# Patient Record
Sex: MALE | Race: WHITE
[De-identification: names, ages, dates, MRNs, and addresses within clinical notes are randomized per-mention and may not be internally consistent; named-entity substitution may affect disease eponyms.]

---

## 2018-02-28 ENCOUNTER — HOSPITAL ENCOUNTER (INPATIENT)
Dept: HOSPITAL 72 - EMR | Age: 53
LOS: 2 days | Discharge: HOME | DRG: 947 | End: 2018-03-02
Payer: MEDICARE

## 2018-02-28 VITALS — SYSTOLIC BLOOD PRESSURE: 120 MMHG | DIASTOLIC BLOOD PRESSURE: 80 MMHG

## 2018-02-28 VITALS — DIASTOLIC BLOOD PRESSURE: 95 MMHG | SYSTOLIC BLOOD PRESSURE: 141 MMHG

## 2018-02-28 VITALS — WEIGHT: 184 LBS | BODY MASS INDEX: 24.39 KG/M2 | HEIGHT: 73 IN

## 2018-02-28 VITALS — SYSTOLIC BLOOD PRESSURE: 139 MMHG | DIASTOLIC BLOOD PRESSURE: 99 MMHG

## 2018-02-28 DIAGNOSIS — E78.5: ICD-10-CM

## 2018-02-28 DIAGNOSIS — Z88.2: ICD-10-CM

## 2018-02-28 DIAGNOSIS — B20: ICD-10-CM

## 2018-02-28 DIAGNOSIS — R94.39: ICD-10-CM

## 2018-02-28 DIAGNOSIS — Z79.899: ICD-10-CM

## 2018-02-28 DIAGNOSIS — G61.81: ICD-10-CM

## 2018-02-28 DIAGNOSIS — R60.0: Primary | ICD-10-CM

## 2018-02-28 DIAGNOSIS — T46.1X5A: ICD-10-CM

## 2018-02-28 DIAGNOSIS — I10: ICD-10-CM

## 2018-02-28 DIAGNOSIS — R74.8: ICD-10-CM

## 2018-02-28 LAB
ADD MANUAL DIFF: NO
ALBUMIN SERPL-MCNC: 3.5 G/DL (ref 3.4–5)
ALBUMIN/GLOB SERPL: 0.7 {RATIO} (ref 1–2.7)
ALP SERPL-CCNC: 51 U/L (ref 46–116)
ALT SERPL-CCNC: 44 U/L (ref 12–78)
ANION GAP SERPL CALC-SCNC: 8 MMOL/L (ref 5–15)
AST SERPL-CCNC: 44 U/L (ref 15–37)
BASOPHILS NFR BLD AUTO: 1.8 % (ref 0–2)
BILIRUB SERPL-MCNC: 0.6 MG/DL (ref 0.2–1)
BUN SERPL-MCNC: 15 MG/DL (ref 7–18)
CALCIUM SERPL-MCNC: 8.6 MG/DL (ref 8.5–10.1)
CHLORIDE SERPL-SCNC: 103 MMOL/L (ref 98–107)
CK MB SERPL-MCNC: 5.3 NG/ML (ref 0–3.6)
CK SERPL-CCNC: 457 U/L (ref 26–308)
CO2 SERPL-SCNC: 27 MMOL/L (ref 21–32)
CREAT SERPL-MCNC: 1.1 MG/DL (ref 0.55–1.3)
EOSINOPHIL NFR BLD AUTO: 2.5 % (ref 0–3)
ERYTHROCYTE [DISTWIDTH] IN BLOOD BY AUTOMATED COUNT: 12.2 % (ref 11.6–14.8)
GLOBULIN SER-MCNC: 4.8 G/DL
HCT VFR BLD CALC: 47.9 % (ref 42–52)
HGB BLD-MCNC: 16.8 G/DL (ref 14.2–18)
LYMPHOCYTES NFR BLD AUTO: 34.9 % (ref 20–45)
MCV RBC AUTO: 93 FL (ref 80–99)
MONOCYTES NFR BLD AUTO: 9.4 % (ref 1–10)
NEUTROPHILS NFR BLD AUTO: 51.4 % (ref 45–75)
PLATELET # BLD: 180 K/UL (ref 150–450)
POTASSIUM SERPL-SCNC: 4 MMOL/L (ref 3.5–5.1)
RBC # BLD AUTO: 5.17 M/UL (ref 4.7–6.1)
SODIUM SERPL-SCNC: 137 MMOL/L (ref 136–145)
WBC # BLD AUTO: 7.1 K/UL (ref 4.8–10.8)

## 2018-02-28 PROCEDURE — 93017 CV STRESS TEST TRACING ONLY: CPT

## 2018-02-28 PROCEDURE — 82553 CREATINE MB FRACTION: CPT

## 2018-02-28 PROCEDURE — 93005 ELECTROCARDIOGRAM TRACING: CPT

## 2018-02-28 PROCEDURE — 83880 ASSAY OF NATRIURETIC PEPTIDE: CPT

## 2018-02-28 PROCEDURE — 80048 BASIC METABOLIC PNL TOTAL CA: CPT

## 2018-02-28 PROCEDURE — 83735 ASSAY OF MAGNESIUM: CPT

## 2018-02-28 PROCEDURE — 93970 EXTREMITY STUDY: CPT

## 2018-02-28 PROCEDURE — 78452 HT MUSCLE IMAGE SPECT MULT: CPT

## 2018-02-28 PROCEDURE — 99285 EMERGENCY DEPT VISIT HI MDM: CPT

## 2018-02-28 PROCEDURE — 71045 X-RAY EXAM CHEST 1 VIEW: CPT

## 2018-02-28 PROCEDURE — 85025 COMPLETE CBC W/AUTO DIFF WBC: CPT

## 2018-02-28 PROCEDURE — 84484 ASSAY OF TROPONIN QUANT: CPT

## 2018-02-28 PROCEDURE — 84443 ASSAY THYROID STIM HORMONE: CPT

## 2018-02-28 PROCEDURE — 93306 TTE W/DOPPLER COMPLETE: CPT

## 2018-02-28 PROCEDURE — 80053 COMPREHEN METABOLIC PANEL: CPT

## 2018-02-28 PROCEDURE — 36415 COLL VENOUS BLD VENIPUNCTURE: CPT

## 2018-02-28 PROCEDURE — 80061 LIPID PANEL: CPT

## 2018-02-28 PROCEDURE — 82550 ASSAY OF CK (CPK): CPT

## 2018-02-28 NOTE — CARDIOLOGY PROGRESS NOTE
Assessment/Plan


Assessment/Plan


[id number]


incerae acei 


venous duplex 


repeat ekg adn trop 


may fgive on e dose of diuretics in am for help with bp cotnroladn edema 


echo





Objective





Last 24 Hour Vital Signs








  Date Time  Temp Pulse Resp B/P (MAP) Pulse Ox O2 Delivery O2 Flow Rate FiO2


 


2/28/18 17:26  88 16   Room Air  


 


2/28/18 17:08 98.8 88 16 176/107 97 Room Air  





 98.8       











Laboratory Tests








Test


  2/28/18


18:39


 


White Blood Count


  7.1 K/UL


(4.8-10.8)


 


Red Blood Count


  5.17 M/UL


(4.70-6.10)


 


Hemoglobin


  16.8 G/DL


(14.2-18.0)


 


Hematocrit


  47.9 %


(42.0-52.0)


 


Mean Corpuscular Volume 93 FL (80-99)  


 


Mean Corpuscular Hemoglobin


  32.6 PG


(27.0-31.0)  H


 


Mean Corpuscular Hemoglobin


Concent 35.2 G/DL


(32.0-36.0)


 


Red Cell Distribution Width


  12.2 %


(11.6-14.8)


 


Platelet Count


  180 K/UL


(150-450)


 


Mean Platelet Volume


  9.5 FL


(6.5-10.1)


 


Neutrophils (%) (Auto)


  51.4 %


(45.0-75.0)


 


Lymphocytes (%) (Auto)


  34.9 %


(20.0-45.0)


 


Monocytes (%) (Auto)


  9.4 %


(1.0-10.0)


 


Eosinophils (%) (Auto)


  2.5 %


(0.0-3.0)


 


Basophils (%) (Auto)


  1.8 %


(0.0-2.0)


 


Sodium Level


  137 MMOL/L


(136-145)


 


Potassium Level


  4.0 MMOL/L


(3.5-5.1)


 


Chloride Level


  103 MMOL/L


()


 


Carbon Dioxide Level


  27 MMOL/L


(21-32)


 


Anion Gap


  8 mmol/L


(5-15)


 


Blood Urea Nitrogen


  15 mg/dL


(7-18)


 


Creatinine


  1.1 MG/DL


(0.55-1.30)


 


Estimat Glomerular Filtration


Rate > 60 mL/min


(>60)


 


Glucose Level


  76 MG/DL


()


 


Calcium Level


  8.6 MG/DL


(8.5-10.1)


 


Total Bilirubin


  0.6 MG/DL


(0.2-1.0)


 


Aspartate Amino Transf


(AST/SGOT) 44 U/L (15-37)


H


 


Alanine Aminotransferase


(ALT/SGPT) 44 U/L (12-78)


 


 


Alkaline Phosphatase


  51 U/L


()


 


Total Creatine Kinase


  457 U/L


()  H


 


Creatine Kinase MB


  5.3 NG/ML


(0.0-3.6)  H


 


Creatine Kinase MB Relative


Index 1.1  


 


 


Troponin I


  0.086 ng/mL


(0.000-0.056)


 


Pro-B-Type Natriuretic Peptide


  67 pg/mL


(0-125)


 


Total Protein


  8.3 G/DL


(6.4-8.2)  H


 


Albumin


  3.5 G/DL


(3.4-5.0)


 


Globulin 4.8 g/dL  


 


Albumin/Globulin Ratio


  0.7 (1.0-2.7)


DEMARCO CARTER Feb 28, 2018 21:09

## 2018-02-28 NOTE — EMERGENCY ROOM REPORT
History of Present Illness


General


Chief Complaint:  Edema


Source:  Patient





Present Illness


HPI


52-year-old male presents ED for evaluation.  Referred here from PMD Dr. Hernandez for evaluation of bilateral leg swelling.  Has been there for soft 

weeks now.  Patient denies any pain.  Denies any chest pain or shortness of 

breath.  No other aggravating relieving factors.  Denies any other associated 

symptom


Allergies:  


Coded Allergies:  


     SULFA (SULFONAMIDE ANTIBIOTICS) (Verified  Allergy, Unknown, 2/28/18)





Patient History


Past Medical History:  HTN


Past Surgical History:  none


Pertinent Family History:  none


Social History:  Denies: smoking, alcohol use, drug use


Immunizations:  UTD


Reviewed Nursing Documentation:  PMH: Agreed, PSxH: Agreed





Nursing Documentation-PMH


Hx Hypertension:  Yes





Review of Systems


All Other Systems:  negative except mentioned in HPI





Physical Exam





Vital Signs








  Date Time  Temp Pulse Resp B/P (MAP) Pulse Ox O2 Delivery O2 Flow Rate FiO2


 


2/28/18 17:08 98.8 88 16 176/107 97 Room Air  





 98.8       








Sp02 EP Interpretation:  reviewed, normal


General Appearance:  no apparent distress, alert, GCS 15, non-toxic


Head:  normocephalic, atraumatic


Eyes:  bilateral eye normal inspection, bilateral eye PERRL


ENT:  hearing grossly normal, normal pharynx, no angioedema, normal voice


Neck:  full range of motion, supple/symm/no masses


Respiratory:  chest non-tender, lungs clear, normal breath sounds, speaking 

full sentences


Cardiovascular #1:  regular rate, rhythm, no edema


Cardiovascular #2:  2+ carotid (R), 2+ carotid (L), 2+ radial (R), 2+ radial (L)

, 2+ dorsalis pedis (R), 2+ dorsalis pedis (L)


Gastrointestinal:  normal bowel sounds, non tender, soft, non-distended, no 

guarding, no rebound


Rectal:  deferred


Genitourinary:  normal inspection, no CVA tenderness


Musculoskeletal:  back normal, gait/station normal, normal range of motion, non-

tender, swelling - 1+ pitting edema b/l LEs


Neurologic:  alert, oriented x3, responsive, motor strength/tone normal, 

sensory intact, speech normal


Psychiatric:  judgement/insight normal, memory normal, mood/affect normal, no 

suicidal/homicidal ideation


Reflexes:  3+ bicep (R), 3+ bicep (L), 3+ tricep (R), 3+ tricep (L), 3+ knee (R)

, 3+ knee (L)


Skin:  normal color, no rash, warm/dry, well hydrated


Lymphatic:  no adenopathy





Medical Decision Making


Diagnostic Impression:  


 Primary Impression:  


 Edema


 Qualified Codes:  R60.9 - Edema, unspecified


 Additional Impression:  


 Elevated troponin


ER Course


Hospital Course 


52-year-old male presents ED for evaluation of bilateral leg swelling





Differential diagnoses include: MI/unstable angina, CHF, pedal edema





Clinical course


Patient placed on stretcher. on cardiac monitor. After initial history and 

physical I ordered labs, EKG, chest x-ray, 





labs reviewed- no leukocytosis, hemoglobin/hematocrit stable, electrolytes okay

, troponin 0.086, BNP okay


EKG-normal sinus rhythm no acute ischemic changes interpreted by me


Chest x-ray- no cardiomegaly, no fluid overload





Patient denies any chest pain or shortness of breath.  Discussed findings with 

patient.  Given the elevated troponin and believe he should be admitted for ACS 

rule out. 





Case discussed with Dr. Hernandez  and he agreed to accept the patient to his 

service for further care and support





Dr Michelle will consult to see the patient





I.  I feel this is a highly complex case requiring extensive working including 

EKG/Rhythm strip, Xray/CT/US, Blood/urine lab work, repeat exams while in ED, 

and administration of strong opiates/narcotics for pain control, admission to 

hospital or close patient follow up.  





Diagnosis - edema, elevated troponin





admitted to telemetry in serious condition





Labs








Test


  2/28/18


18:39


 


White Blood Count


  7.1 K/UL


(4.8-10.8)


 


Red Blood Count


  5.17 M/UL


(4.70-6.10)


 


Hemoglobin


  16.8 G/DL


(14.2-18.0)


 


Hematocrit


  47.9 %


(42.0-52.0)


 


Mean Corpuscular Volume 93 FL (80-99) 


 


Mean Corpuscular Hemoglobin


  32.6 PG


(27.0-31.0)


 


Mean Corpuscular Hemoglobin


Concent 35.2 G/DL


(32.0-36.0)


 


Red Cell Distribution Width


  12.2 %


(11.6-14.8)


 


Platelet Count


  180 K/UL


(150-450)


 


Mean Platelet Volume


  9.5 FL


(6.5-10.1)


 


Neutrophils (%) (Auto)


  51.4 %


(45.0-75.0)


 


Lymphocytes (%) (Auto)


  34.9 %


(20.0-45.0)


 


Monocytes (%) (Auto)


  9.4 %


(1.0-10.0)


 


Eosinophils (%) (Auto)


  2.5 %


(0.0-3.0)


 


Basophils (%) (Auto)


  1.8 %


(0.0-2.0)


 


Sodium Level


  137 MMOL/L


(136-145)


 


Potassium Level


  4.0 MMOL/L


(3.5-5.1)


 


Chloride Level


  103 MMOL/L


()


 


Carbon Dioxide Level


  27 MMOL/L


(21-32)


 


Anion Gap


  8 mmol/L


(5-15)


 


Blood Urea Nitrogen


  15 mg/dL


(7-18)


 


Creatinine


  1.1 MG/DL


(0.55-1.30)


 


Estimat Glomerular Filtration


Rate > 60 mL/min


(>60)


 


Glucose Level


  76 MG/DL


()


 


Calcium Level


  8.6 MG/DL


(8.5-10.1)


 


Total Bilirubin


  0.6 MG/DL


(0.2-1.0)


 


Aspartate Amino Transf


(AST/SGOT) 44 U/L (15-37) 


 


 


Alanine Aminotransferase


(ALT/SGPT) 44 U/L (12-78) 


 


 


Alkaline Phosphatase


  51 U/L


()


 


Total Creatine Kinase


  457 U/L


()


 


Creatine Kinase MB


  5.3 NG/ML


(0.0-3.6)


 


Creatine Kinase MB Relative


Index 1.1 


 


 


Troponin I


  0.086 ng/mL


(0.000-0.056)


 


Pro-B-Type Natriuretic Peptide


  67 pg/mL


(0-125)


 


Total Protein


  8.3 G/DL


(6.4-8.2)


 


Albumin


  3.5 G/DL


(3.4-5.0)


 


Globulin 4.8 g/dL 


 


Albumin/Globulin Ratio 0.7 (1.0-2.7) 








EKG Diagnostic Results


Rate:  normal


Rhythm:  NSR


ST Segments:  no acute changes


ASA given to the pt in ED:  Yes





Rhythm Strip Diag. Results


EP Interpretation:  yes


Rhythm:  NSR, no PVC's, no ectopy





Chest X-Ray Diagnostic Results


Chest X-Ray Diagnostic Results :  


   Chest X-Ray Ordered:  Yes


   # of Views/Limited/Complete:  1 View


   Indication:  Shortness of Breath


   EP Interpretation:  Yes


   Interpretation:  no consolidation, no effusion, no pneumothorax, no acute 

cardiopulmonary disease


   Impression:  No acute disease


   Electronically Signed by:  Electronically signed by Brennon Kapoor MD





Last Vital Signs








  Date Time  Temp Pulse Resp B/P (MAP) Pulse Ox O2 Delivery O2 Flow Rate FiO2


 


2/28/18 17:26  88 16   Room Air  


 


2/28/18 17:08 98.8   176/107 97   





 98.8       








Status:  improved


Disposition:  ADMITTED AS INPATIENT


Condition:  Serious


Referrals:  


MIGUEL LEDESMA (PCP)











BRENNON KAPOOR M.D. Feb 28, 2018 22:19

## 2018-03-01 VITALS — SYSTOLIC BLOOD PRESSURE: 135 MMHG | DIASTOLIC BLOOD PRESSURE: 91 MMHG

## 2018-03-01 VITALS — DIASTOLIC BLOOD PRESSURE: 87 MMHG | SYSTOLIC BLOOD PRESSURE: 135 MMHG

## 2018-03-01 VITALS — SYSTOLIC BLOOD PRESSURE: 119 MMHG | DIASTOLIC BLOOD PRESSURE: 82 MMHG

## 2018-03-01 VITALS — SYSTOLIC BLOOD PRESSURE: 139 MMHG | DIASTOLIC BLOOD PRESSURE: 88 MMHG

## 2018-03-01 VITALS — SYSTOLIC BLOOD PRESSURE: 115 MMHG | DIASTOLIC BLOOD PRESSURE: 80 MMHG

## 2018-03-01 LAB
ADD MANUAL DIFF: NO
ANION GAP SERPL CALC-SCNC: 6 MMOL/L (ref 5–15)
BASOPHILS NFR BLD AUTO: 0.9 % (ref 0–2)
BUN SERPL-MCNC: 14 MG/DL (ref 7–18)
CALCIUM SERPL-MCNC: 8.9 MG/DL (ref 8.5–10.1)
CHLORIDE SERPL-SCNC: 102 MMOL/L (ref 98–107)
CHOLEST SERPL-MCNC: 215 MG/DL (ref ?–200)
CO2 SERPL-SCNC: 30 MMOL/L (ref 21–32)
CREAT SERPL-MCNC: 1.1 MG/DL (ref 0.55–1.3)
EOSINOPHIL NFR BLD AUTO: 2.5 % (ref 0–3)
ERYTHROCYTE [DISTWIDTH] IN BLOOD BY AUTOMATED COUNT: 12.2 % (ref 11.6–14.8)
HCT VFR BLD CALC: 51.2 % (ref 42–52)
HDLC SERPL-MCNC: 44 MG/DL (ref 40–60)
HGB BLD-MCNC: 17.8 G/DL (ref 14.2–18)
LYMPHOCYTES NFR BLD AUTO: 27.1 % (ref 20–45)
MCV RBC AUTO: 95 FL (ref 80–99)
MONOCYTES NFR BLD AUTO: 9.4 % (ref 1–10)
NEUTROPHILS NFR BLD AUTO: 60 % (ref 45–75)
PLATELET # BLD: 178 K/UL (ref 150–450)
POTASSIUM SERPL-SCNC: 4.3 MMOL/L (ref 3.5–5.1)
RBC # BLD AUTO: 5.42 M/UL (ref 4.7–6.1)
SODIUM SERPL-SCNC: 138 MMOL/L (ref 136–145)
TRIGL SERPL-MCNC: 60 MG/DL (ref 30–150)
WBC # BLD AUTO: 6 K/UL (ref 4.8–10.8)

## 2018-03-01 RX ADMIN — ATORVASTATIN CALCIUM SCH MG: 20 TABLET, FILM COATED ORAL at 21:20

## 2018-03-01 RX ADMIN — HEPARIN SODIUM SCH UNITS: 5000 INJECTION INTRAVENOUS; SUBCUTANEOUS at 21:21

## 2018-03-01 RX ADMIN — LISINOPRIL SCH MG: 20 TABLET ORAL at 09:44

## 2018-03-01 RX ADMIN — LISINOPRIL SCH MG: 20 TABLET ORAL at 01:56

## 2018-03-01 RX ADMIN — HEPARIN SODIUM SCH UNITS: 5000 INJECTION INTRAVENOUS; SUBCUTANEOUS at 09:45

## 2018-03-01 RX ADMIN — LISINOPRIL SCH MG: 20 TABLET ORAL at 18:21

## 2018-03-01 RX ADMIN — ASPIRIN 81 MG SCH MG: 81 TABLET ORAL at 09:44

## 2018-03-01 NOTE — HISTORY AND PHYSICAL REPORT
DATE OF ADMISSION:  02/28/2018



REASON FOR ADMISSION:  The patient is a 52-year-old anglin white male with HIV

infection who is admitted via the emergency with acute coronary syndrome.

 



HISTORY OF PRESENT ILLNESS:  The patient has hypertension poorly controlled

and was started on amlodipine in December 2017.  Over the last two weeks,

he developed peripheral edema and weight gain.  He continued taking

medication and travels to [place] and has only recently turned.  He stopped

eating amlodipine 48 hours ago.  He has had cough and some shortness of

breath but not chest pain.He has had peripheral ankle and foot edema.  He

has no prior cardiac history.  He was seen in the emergency room and was

found to have elevated troponin and was subsequently seen by Dr. Michelle

in Cardiology consultation and recommendation was for admission.

Electrocardiogram showed possible anterior myocardial infarction of

unknown ______



PAST MEDICAL HISTORY:  Remarkable for longstanding HIV disease.  He had one

prior [facility] admission for sepsis related to infected central line.  He

has had chronic idiopathic polyneuropathy for which he has been on

intravenous immunoglobulin therapy at home.  He has been on effective

anti-retroviral therapy most recently with Descovy, Reyataz, Norvir with

an undetectable viral load and CD4 cells in the range of 400 per cubic

millimeter.  He has also had some chronic pain related to neuropathy for

which he has used Norco no more than one tablet daily.



ALLERGIES:  None.



MEDICATIONS:  Include Reyataz 300 mg daily, Norvir 100 milligram daily,

Descovy one tablet daily.



FAMILY HISTORY:  Remarkable for his mother who has a problem with chronic

alcoholism.



SOCIAL HISTORY:  He lives with his mother in ______ [place] .  He is a riding

instructor and .  Family's origins are in [place].  He is

homosexual and is sexually active with one partner.



REVIEW OF SYSTEMS:  Negative for nausea, vomiting, diarrhea, abdominal

pain.  It is negative for orthopnea and PND.  It is positive for dyspnea

on exertion and ankle edema and negative for chest pain.



PAST SURGICAL HISTORY:  Remarkable for treatment acoustic neuroma on the

right side, subsequently neutron beam therapy and no evidence of

recurrence.



PHYSICAL EXAMINATION:

VITAL SIGNS:  Within normal limits.

HEENT:  Head, ears eyes, nose, and throat are normal. Slight right facial

droop.  Neck is supple.  Thyroid not palpable.

LUNGS:  Clear.

HEART:  S1 and S2 normal.  There is a grade 3 systolic ejection murmur at

the left sternal border.  No gallop.

ABDOMEN:  Soft without palpable liver or spleen.

EXTREMITIES:  Remarkable for 3+ pitting edema in the pretibial and ankle

areas.

NEUROLOGIC:  He is alert and oriented.  Cranial nerves II through XII

normal.  Motor, sensory, and cerebellar modalities are intact.



ASSESSMENT:

1. New onset edema, possible acute coronary syndrome versus side effect

of amlodipine.

2. HIV disease.

3. Status post acoustic neuroma.

4. Chronic idiopathic demyelinating polyneuropathy on monthly

intravenous immunoglobulin therapy.



PLAN:  He will be seen in Cardiology consultation by Dr. Michelle.

Monitoring will be ordered. Diuresis will be ordered gently.  Blood

pressure will be controlled.  Serial troponins will be followed.  Once

stable, he will be discharged home.









  ______________________________________________

  Florencio Norwood M.D.





DR:  Tyra

D:  03/01/2018 11:07

T:  03/01/2018 19:55

JOB#:  [id number]

CC:

## 2018-03-01 NOTE — CONSULTATION
DATE OF CONSULTATION:  02/28/2018



CONSULTING PHYSICIAN:  Haresh Michelle M.D.



REFERRING PHYSICIAN:  Florencio Norwood M.D.



REASON FOR REFERRAL:  Ankle swelling.



HISTORY OF PRESENT ILLNESS:  This is a 52-year-old gentleman with history

of hypertension, recent blood pressure medication changed approximately

two weeks ago or so.  Over the past few days before coming into the

hospital, he has had increasing amounts of swelling around the ankle and

the leg.  He apparently rides horses and when he does ride the horses he

has noticed that he has some discomfort in his thigh.  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

he noted to have some swelling in his abdominal wall and lower

extremities, and therefore, he got concerned.  He contacted Dr. Norwood

and was seen today, was sent to the emergency room for evaluation.  Today,

in the emergency room, he basically had some abnormal cardiac enzyme,

minimal abnormality, therefore, this consultation was requested.  The

patient absolutely denies any chest pain or pressure.  There is no PND.

No orthopnea.  No palpitations.  No dizziness or lightheadedness on

standing except for the fact that he has some leg swelling at this time,

although he stopped taking the new medication that he was given and he

feels that the leg swelling has gotten better, but he is concerned enough

to come into the hospital.



PAST MEDICAL HISTORY:  Positive for high blood pressure.  No history of

heart attack.  No cancer, although he has had some tumor on his _____ that

was resected by Gamma Knife number years ago that left him with some

facial numbness on the right side.  No stroke.  No hepatitis or

tuberculosis.  No asthma or emphysema.  No ulcers.  No kidney problems,

liver problems, thyroid problems, anemia, or arthritis.  He does have HIV

undetectable for 20 years.  No prostate problems.  No other medical

problems.



ALLERGIES:  He is allergic to sulfonamides.



SOCIAL HISTORY:  He does not smoke or drink alcoholic beverages.  No drug

use.



REVIEW OF SYSTEMS:  GASTROINTESTINAL:  Negative.    GENITOURINARY:

Negative.    PULMONARY:  Some sore throat recently.  His partner has been

diagnosed with strep throat.  He has had some minimal coughing for the

past two days.  He has got some numbness and tingling sensation on the

right side of his face.   NEUROLOGIC:  He has tingling sensation in his

feet from neuropathy that he has had for number of times.



PHYSICAL EXAMINATION:

GENERAL:  Shows him to be middle-aged gentleman, in no respiratory

distress.

HEENT:  Unremarkable.

NECK:  Supple.  No jugular venous distention.  No abdominojugular reflux

noted.

LUNGS:  Appear to be clear to auscultation and percussion.

CARDIAC:  S1 is normal.  S2 is normal.  Regular rate and rhythm.  No

heaves, thrills, gallops, or rubs are noted.

ABDOMEN:  Soft and nontender.  Positive bowel sounds.

EXTREMITIES:  1+ edema of the lower extremities in the calves that is where

I feel it and then more distally.

NEUROLOGICAL:  He is awake, alert, responsive, in no apparent distress.



LABORATORY AND DIAGNOSTIC DATA:  White count of 7.1, hemoglobin 16.8, and

platelet count of 180.  Sodium is 137, potassium of 4.0, chloride 102,

bicarb 28, BUN of 15, creatinine 1.1, and glucose of 76.  Liver function

tests are normal.  Total CK of 457 with an MB of 5.3.  Troponin I is

0.086.  His proBNP is only 67.  Total protein 8.3.  He had a gammaglobulin

of 4.8.  He has had an electrocardiogram here in the hospital that showed

some nonspecific T-wave changes and delay in R-wave progression, but there

is an R-wave progression.



ASSESSMENT AND PLAN:

1. Peripheral edema, likely secondary to amlodipine.

2. Abnormal cardiac enzymes without signs or symptoms of coronary

syndrome.

3. History of hypertension.

4. Human immunodeficiency virus positive.

5. 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



Dr. Norwood, this patient was seen in cardiac consultation.  The

patient endorses no signs, no symptoms of either congestive heart failure

or the coronary syndrome.  His cardiac enzyme abnormalities at this

hospital may be false positive _____ further testing.  I think it is

reasonable to have him be admitted.  Venous duplex of lower extremities

will be ordered.  An echocardiogram will be ordered for evaluation of

systolic function and right ventricular function.  He has already stopped

his amlodipine which I suspect may be the cause, although I need to

confirm that.  His blood pressure is still quite high with 176/107, blood

pressure reading today here in the emergency room.  His lisinopril will be

continued, although he may need adjustment of the dose.  He reportedly is

on 20 mg daily that will be increased to 40 mg and I think in light of his

swelling, the dose of diuretics may be in order, not only for the

treatment of swelling, but maybe help his blood pressure control as well.

I have discussed those and the possibility of false positive abnormal

cardiac enzymes also with the patient.  His proBNP is really normal and

not suggestive of heart failure either.









  ______________________________________________

  Haresh Michelle M.D.





DR:  [place]

D:  02/28/2018 21:08

T:  03/01/2018 00:27

JOB#:  [id number]

CC:

## 2018-03-01 NOTE — DIAGNOSTIC IMAGING REPORT
Indication: Shortness of breath

 

Technique: One view of the chest

 

Comparison: 9/26/2011 2 view chest

 

Findings: Lungs and pleural spaces are clear. Heart is upper limits of normal in

size. The aorta is tortuous. No significant interim change

 

Impression: No acute process

## 2018-03-01 NOTE — CARDIOLOGY PROGRESS NOTE
Assessment/Plan


Assessment/Plan


1. Peripheral edema, likely secondary to amlodipine.


2. Abnormal cardiac enzymes 


3. History of hypertension.


4. Human immunodeficiency virus positive.


5. 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


6. hyper lipidemia 








his edema has all resolved 


but [facility] 


will repat jeff dn ekg 


keep in house 


administer asa adn statin for now 


no caffein diet 


alejandro have stress test tomorrow and d/c home if all ok 


d/w pt and mother and dr reynolds





Subjective


Cardiovascular:  Reports: chest pain - no ches tpain utnil late this aftenoon 

he noted a pressing senation in [facility] center of [facility] chest didinto last long but 

he had not noted sthis beforer , Denies: lightheadedness, palpitations


Respiratory:  Denies: shortness of breath, SOB with excertion


Gastrointestinal/Abdominal:  Denies: abdominal pain


Genitourinary:  Denies: burning





Objective





Last 24 Hour Vital Signs








  Date Time  Temp Pulse Resp B/P (MAP) Pulse Ox O2 Delivery O2 Flow Rate FiO2


 


3/1/18 16:00 98.2 84 19 139/88 97   





 98.2       


 


3/1/18 12:00 97.0 85 19 115/80 98   





 97.0       


 


3/1/18 12:00  89      


 


3/1/18 09:44    99/62    


 


3/1/18 08:00  88      


 


3/1/18 08:00 98.3 84 18 119/82 97   





 98.3       


 


3/1/18 05:00 97.7 95 18 135/87 97   





 97.7       


 


3/1/18 03:49  85      


 


3/1/18 01:56    160/90    


 


3/1/18 00:10 98.9 102 19 120/80 98 Room Air  





 98.9       


 


2/28/18 23:55 98.9 102 19 120/80 98 Room Air  





 98.9       


 


2/28/18 22:45 98.7 95 19 139/99 98 Room Air  





 98.7       


 


2/28/18 19:45 98.8 98 18 141/95 97 Room Air  





 98.8       


 


2/28/18 17:26  88 16   Room Air  


 


2/28/18 17:08 98.8 88 16 176/107 97 Room Air  





 98.8       








General Appearance:  alert


Neck:  supple


Cardiovascular:  normal rate, regular rhythm


Respiratory/Chest:  lungs clear


Abdomen:  normal bowel sounds, non tender, soft


Extremities:  no swelling











Intake and Output  


 


 2/28/18 3/1/18





 19:00 07:00


 


Output Total  1800 ml


 


Balance  -1800 ml


 


  


 


Output Urine Total  1800 ml











Laboratory Tests








Test


  2/28/18


18:39 3/1/18


07:20


 


White Blood Count


  7.1 K/UL


(4.8-10.8) 6.0 K/UL


(4.8-10.8)


 


Red Blood Count


  5.17 M/UL


(4.70-6.10) 5.42 M/UL


(4.70-6.10)


 


Hemoglobin


  16.8 G/DL


(14.2-18.0) 17.8 G/DL


(14.2-18.0)


 


Hematocrit


  47.9 %


(42.0-52.0) 51.2 %


(42.0-52.0)


 


Mean Corpuscular Volume 93 FL (80-99)   95 FL (80-99)  


 


Mean Corpuscular Hemoglobin


  32.6 PG


(27.0-31.0)  H 32.9 PG


(27.0-31.0)  H


 


Mean Corpuscular Hemoglobin


Concent 35.2 G/DL


(32.0-36.0) 34.8 G/DL


(32.0-36.0)


 


Red Cell Distribution Width


  12.2 %


(11.6-14.8) 12.2 %


(11.6-14.8)


 


Platelet Count


  180 K/UL


(150-450) 178 K/UL


(150-450)


 


Mean Platelet Volume


  9.5 FL


(6.5-10.1) 9.8 FL


(6.5-10.1)


 


Neutrophils (%) (Auto)


  51.4 %


(45.0-75.0) 60.0 %


(45.0-75.0)


 


Lymphocytes (%) (Auto)


  34.9 %


(20.0-45.0) 27.1 %


(20.0-45.0)


 


Monocytes (%) (Auto)


  9.4 %


(1.0-10.0) 9.4 %


(1.0-10.0)


 


Eosinophils (%) (Auto)


  2.5 %


(0.0-3.0) 2.5 %


(0.0-3.0)


 


Basophils (%) (Auto)


  1.8 %


(0.0-2.0) 0.9 %


(0.0-2.0)


 


Sodium Level


  137 MMOL/L


(136-145) 138 MMOL/L


(136-145)


 


Potassium Level


  4.0 MMOL/L


(3.5-5.1) 4.3 MMOL/L


(3.5-5.1)


 


Chloride Level


  103 MMOL/L


() 102 MMOL/L


()


 


Carbon Dioxide Level


  27 MMOL/L


(21-32) 30 MMOL/L


(21-32)


 


Anion Gap


  8 mmol/L


(5-15) 6 mmol/L


(5-15)


 


Blood Urea Nitrogen


  15 mg/dL


(7-18) 14 mg/dL


(7-18)


 


Creatinine


  1.1 MG/DL


(0.55-1.30) 1.1 MG/DL


(0.55-1.30)


 


Estimat Glomerular Filtration


Rate > 60 mL/min


(>60) > 60 mL/min


(>60)


 


Glucose Level


  76 MG/DL


() 85 MG/DL


()


 


Calcium Level


  8.6 MG/DL


(8.5-10.1) 8.9 MG/DL


(8.5-10.1)


 


Total Bilirubin


  0.6 MG/DL


(0.2-1.0) 


 


 


Aspartate Amino Transf


(AST/SGOT) 44 U/L (15-37)


H 


 


 


Alanine Aminotransferase


(ALT/SGPT) 44 U/L (12-78)


  


 


 


Alkaline Phosphatase


  51 U/L


() 


 


 


Total Creatine Kinase


  457 U/L


()  H 


 


 


Creatine Kinase MB


  5.3 NG/ML


(0.0-3.6)  H 


 


 


Creatine Kinase MB Relative


Index 1.1  


  


 


 


Troponin I


  0.086 ng/mL


(0.000-0.056) 0.071 ng/mL


(0.000-0.056)


 


Pro-B-Type Natriuretic Peptide


  67 pg/mL


(0-125) 


 


 


Total Protein


  8.3 G/DL


(6.4-8.2)  H 


 


 


Albumin


  3.5 G/DL


(3.4-5.0) 


 


 


Globulin 4.8 g/dL   


 


Albumin/Globulin Ratio


  0.7 (1.0-2.7)


L 


 


 


Magnesium Level


  


  1.8 MG/DL


(1.8-2.4)


 


Triglycerides Level


  


  60 MG/DL


()


 


Cholesterol Level


  


  215 MG/DL (<


200)  H


 


LDL Cholesterol


  


  165 mg/dL


(<100)  H


 


HDL Cholesterol


  


  44 MG/DL


(40-60)


 


Cholesterol/HDL Ratio


  


  4.9 (3.3-4.4)


H


 


Thyroid Stimulating Hormone


(TSH) 


  1.568 uiU/mL


(0.358-3.740)

















DEMARCO WILEY Mar 1, 2018 16:44

## 2018-03-01 NOTE — CARDIOLOGY REPORT
--------------- APPROVED REPORT --------------





EXAM: Two-dimensional and M-mode echocardiogram with Doppler and color 

Doppler.



INDICATION

CAD 



M-Mode DIMENSIONS 

IVSd1.3 (0.7-1.1cm)Left Atrium (MM)3.3 (1.6-4.0cm)

LVDd5.0 (3.5-5.6cm)Aortic Root3.7 (2.0-3.7cm)

PWd1.4 (0.7-1.1cm)Aortic Cusp Exc.2.1 (1.5-2.0cm)



LVDs3.3 (2.5-4.0cm)

PWs2.0 cm





Normal left ventricular chamber size, systolic function and wall motion.

Left ventricular ejection fraction estimated to be 50-55  %.

Mild left ventricular hypertrophy.

No evidence of pericardial effusion. 

All other cardiac chamber sizes are within normal limits. 

Focal aortic valve sclerosis with adequate cusp excursion.

Thickened mitral valve leaflets with normal excursion.

Mitral annulus and aortic root calcification.

Pulmonic valve not well visualized.

Normal tricuspid valve structure. 

IVC at normal size with physiologic collapse.



A  color flow and spectral Doppler study was performed and revealed:

Mild mitral regurgitation.

Mitral diastolic velocities suggest reduced left ventricular relaxation c/w mild LV 

diastolic dysfunction (Grade I). 

Trace tricuspid regurgitation.

Tricuspid  systolic velocities suggests peak right ventricular systolic pressure of 20   

mmHg.

## 2018-03-01 NOTE — GENERAL PROGRESS NOTE
Progress Note


Progress Note


Stable VS. Feeling well. Had marked diuresis after lasix. Feeling much better. 

Appetite good. Ambulatory.


Exam: no edema, lunhs clear


ECHO pending. Troponin negative.





A: probably adverse reaction to amlodepine. No evidence for coronary syndrome.





Home if cleared by cardiology.








Miguel Norwood M.D.











MIGUEL NORWOOD Mar 1, 2018 16:14

## 2018-03-02 VITALS — SYSTOLIC BLOOD PRESSURE: 128 MMHG | DIASTOLIC BLOOD PRESSURE: 81 MMHG

## 2018-03-02 VITALS — DIASTOLIC BLOOD PRESSURE: 85 MMHG | SYSTOLIC BLOOD PRESSURE: 123 MMHG

## 2018-03-02 VITALS — SYSTOLIC BLOOD PRESSURE: 134 MMHG | DIASTOLIC BLOOD PRESSURE: 79 MMHG

## 2018-03-02 VITALS — DIASTOLIC BLOOD PRESSURE: 71 MMHG | SYSTOLIC BLOOD PRESSURE: 105 MMHG

## 2018-03-02 VITALS — DIASTOLIC BLOOD PRESSURE: 86 MMHG | SYSTOLIC BLOOD PRESSURE: 122 MMHG

## 2018-03-02 VITALS — DIASTOLIC BLOOD PRESSURE: 84 MMHG | SYSTOLIC BLOOD PRESSURE: 124 MMHG

## 2018-03-02 RX ADMIN — ATORVASTATIN CALCIUM SCH MG: 20 TABLET, FILM COATED ORAL at 20:34

## 2018-03-02 RX ADMIN — HEPARIN SODIUM SCH UNITS: 5000 INJECTION INTRAVENOUS; SUBCUTANEOUS at 09:00

## 2018-03-02 RX ADMIN — LISINOPRIL SCH MG: 20 TABLET ORAL at 10:06

## 2018-03-02 RX ADMIN — ASPIRIN 81 MG SCH MG: 81 TABLET ORAL at 10:05

## 2018-03-02 RX ADMIN — HEPARIN SODIUM SCH UNITS: 5000 INJECTION INTRAVENOUS; SUBCUTANEOUS at 20:35

## 2018-03-02 RX ADMIN — LISINOPRIL SCH MG: 20 TABLET ORAL at 17:06

## 2018-03-02 NOTE — CARDIOLOGY PROGRESS NOTE
Assessment/Plan


Assessment/Plan


1. Peripheral edema, likely secondary to amlodipine.


2. Abnormal cardiac enzymes 


3. History of hypertension.


4. Human immunodeficiency virus positive.


5. 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


6. hyper lipidemia 








his edema has all resolved 


he had a treadmill Cardiolite stress test  10 min to hr of 157 did not have any 

chest pain adn no ekg abn, no reversible perfusion defect some non reversible 

defect noted 


trop now normal 


home with insturcntip ofn no havy exertion al active 


er precaution discussed 


administer asa adn statin and low dose bb 


d/w dr reynolds 


will need ot arrange for cath at [facility]





Subjective


Cardiovascular:  Denies: chest pain - despite running on ontenth treadmil today 

, lightheadedness, palpitations


Respiratory:  Denies: shortness of breath


Gastrointestinal/Abdominal:  Denies: abdominal pain


Genitourinary:  Denies: burning





Objective





Last 24 Hour Vital Signs








  Date Time  Temp Pulse Resp B/P (MAP) Pulse Ox O2 Delivery O2 Flow Rate FiO2


 


3/2/18 17:06    126/80    


 


3/2/18 16:00 97.9 78 18 123/85 97 Room Air  





 97.9       


 


3/2/18 16:00  100      


 


3/2/18 12:00  91      


 


3/2/18 12:00 97.7 61 18 105/71 99 Room Air  





 97.7       


 


3/2/18 10:06    134/79    


 


3/2/18 08:00 97.0 81 21 134/79 97 Room Air  





 97.0       


 


3/2/18 08:00  78      


 


3/2/18 04:00  75      


 


3/2/18 04:00 97.2 72 20 122/86 96 Room Air  





 97.2       


 


3/2/18 00:00 97.8 83 20 124/84 96 Room Air  





 97.8       


 


3/2/18 00:00  79      


 


3/1/18 20:15 97.7 79 20 135/91 96 Room Air  





 97.7       


 


3/1/18 20:00  83      








General Appearance:  no apparent distress, alert


Neck:  supple


Cardiovascular:  normal rate, regular rhythm


Respiratory/Chest:  lungs clear, normal breath sounds


Abdomen:  normal bowel sounds, non tender, soft


Extremities:  no swelling











Intake and Output  


 


 3/1/18 3/2/18





 19:00 07:00


 


Intake Total 240 ml 


 


Balance 240 ml 


 


  


 


Intake Oral 240 ml 


 


# Voids 1 2











Laboratory Tests








Test


  3/2/18


07:20


 


Troponin I


  0.046 ng/mL


(0.000-0.056)

















DEMARCO WILEY Mar 2, 2018 18:50

## 2018-03-02 NOTE — CARDIOLOGY REPORT
--------------- APPROVED REPORT --------------





EKG Measurement

Heart [id number]

LA [id number]

[id number]

[id number]

[id number]





Normal sinus rhythm

Nonspecific T wave abnormality

Abnormal ECG

## 2018-03-02 NOTE — DIAGNOSTIC IMAGING REPORT
Indication:  chest pain

 

Technique: The study was conducted under the supervision of a cardiologist. Exercise

on a treadmill utilizing (Robinson protocol) followed by intravenous administration of

32.1 mCi of technetium 99m Myoview was performed. Three plane SPECT imaging of the

heart was then performed. A resting study was performed as part of the one-day

protocol with 10.0 mCi of technetium 99m myoview injected intravenously at that time.

Three plane SPECT imaging of the heart was obtained.

 

Comparison: None

 

Clinical data:  85% maximum predicted heart rate: 141

 

Resting heart rate: 95, peak exercise heart rate: 157 (target reached)

 

Resting blood pressure: 135/92. Peak exercise blood pressure: 162/82

 

Duration of exercise: 10 minutes.

 

Symptoms: No chest pain.

 

Reason for stopping the examination: Achieved target heart rate.

 

EKG changes: None

 

1. Clinical response:  Non ischemic

2. Electrocardiographic response: Non ischemic

 

Findings: 

 

The myocardial perfusion scan demonstrates no definite reversible perfusion defects.

However there is generalized diminished perfusion in the inferior wall and the

septum. There is a transmural perfusion defect in the anteroseptal segment extending

to the apex. Left ventricle ejection fraction is 43%. There is global hypokinesis.

There is dyskinesia involving part of the apex.

 

IMPRESSION:

 

No definite evidence for myocardial ischemia.

 

Suspected myocardial infarct in the anteroseptal segment extending to the apex.

 

LVEF estimated at 43%

## 2018-03-02 NOTE — CARDIOLOGY REPORT
--------------- APPROVED REPORT --------------





EKG Measurement

Heart [id number]

ID [id number]

[id number]

[id number]

[id number]





Normal sinus rhythm

Septal infarct, age undetermined

Abnormal ECG

## 2018-03-02 NOTE — DISCHARGE INSTRUCTIONS
Discharge Instructions


Discharge Instructions


Special Instructions


fu with dr leon with in 1 week 


to er if any cp persistent for more than 15min





For Congestive Heart Failure


Reminder


Report to your physician any weight gain of 5 pounds or more in one week.











DEMARCO LEON Mar 2, 2018 18:55

## 2018-03-04 NOTE — CARDIOLOGY REPORT
--------------- APPROVED REPORT --------------





EKG Measurement

Heart [id number]

TN 170P61

[id number]

[id number]

[id number]





Normal sinus rhythm

Moderate voltage criteria for LVH, may be normal variant

Cannot rule out Septal infarct, age undetermined

Abnormal ECG

## 2018-03-05 NOTE — DISCHARGE SUMMARY
Discharge Summary


Hospital Course


Date of Admission


Feb 28, 2018 at 20:16


Date of Discharge


Mar 2, 2018 at 21:00


Admitting Diagnosis


SOB, ACUTE CORONARY SYNDROME


HPI


Bienvenido Tam III is a 52 year old male who was admitted on Feb 28, 2018 at 20:

16 for Sob, Acute Coronary Syndrome


Hospital Course


dc summary #[id number]





Discharge Medications


New Medications:  


Atenolol (Tenormin) 25 Mg Tablet


25 MG ORAL DAILY, #30 TAB





Aspirin* (Aspirin*) 81 Mg Tab.chew


81 MG ORAL DAILY, #30 TAB





Atorvastatin Calcium* (Lipitor*) 20 Mg Tablet


80 MG ORAL BEDTIME, #30 TAB





Lisinopril (Lisinopril*) 20 Mg Tablet


20 MG ORAL BID, #60 TAB





 


Discontinued Medications:  


Amlodipine Besylate* (Amlodipine Besylate*) 10 Mg Tablet


10 MG ORAL DAILY, TAB





Atorvastatin Calcium* (Atorvastatin Calcium*) 20 Mg Tablet


20 MG ORAL BEDTIME, TAB





Lisinopril (Lisinopril*) 20 Mg Tablet


20 MG ORAL DAILY, TAB











Discharge


Condition Upon Discharge:  stable


Discharge Disposition


Patient was discharged to Home (01)


Discharge Diagnoses:  





Discharge Instructions


Discharge Instructions


Special Instructions


I have been assigned to complete a D/C Summary on this account. I was not 

involved in the patient management











Kristi Barreto NP (Vanchtein) Mar 5, 2018 12:08

## 2018-03-06 NOTE — DISCHARGE SUMMARY 2 SIG
DATE OF ADMISSION:  02/28/2018



DATE OF DISCHARGE:  03/02/2018



REASON FOR ADMISSION:   52-year-old male with history of

HIV disease, was sent for evaluation by his primary care provider 

for new onset of bilateral lower extremity swelling for few weeks.  

Upon evaluation, blood pressure high -176/107; other vital signs stable.

No leukocytosis.  Stable hemoglobin and hematocrit.  

Troponin elevated - 0.086.  ProBNP within normal limits - 67.  EKG revealed 

normal sinus rate, no acute ischemic changes.  Chest x-ray revealed 

no acute cardiopulmonary pathology.  The patient admitted to telemetry

floor with diagnosis of new onset of peripheral edema, abnormal cardiac 

enzymes, HIV disease, chronic idiopathic demyelinating polyneuropathy

(on monthly IV immunoglobulin), status post acoustic neuroma resection.



HOSPITAL COURSE:  The patient admitted to telemetry floor.  Serial

troponin, EKG, and echo were ordered and Cardiology consult was requested.

Troponin were slowly trending down,  and the last one was negative.  Lipid

panel revealed elevated cholesterol -215 and elevated LDL -165.

Echocardiogram revealed left ventricular ejection fraction of 50% to 55%,

mild left ventricular hypertrophy, right ventricular systolic pressure of 20.

Blood pressure was managed with ACE inhibitor, and dose was increased.  

Blood pressure improved.  The patient subsequently undergone stress test, 

which revealed no definite reversible perfusion defect.  There was transmural

perfusion defect with anteroseptal segment extending to the apex.  Left

ventricular ejection fraction 43% and global hypokinesis and dyskinesia

involving part of apex.  No evidence of prior myocardial ischemia.

Findings of nuclear stress test were discussed by cardiologist with the

patient.  The patient did not have any cardiac symptoms and no reversible

perfusion defects were found.  Last troponin was negative.  Peripheral edema 

was likely Norvasc-related (patient was taking Norvasc prior to admission),

and it resolved with discontinuation of Norvasc.Cardiologist cleared the 
patient 

for discharge home with instructions of no heavy exertion on  activities.  

ER precautions were discussed.  The patient started on aspirin, statin, and 

low-dose beta-blocker.  The patient  was advised  to have  catheterization 

at [facility].  The patient verbalized discharge instructions.



FINAL DIAGNOSES:

1. New onset of peripheral edema, likely Norvasc-related, resolved.

2. Abnormal cardiac enzymes.

3. Human immunodeficiency virus disease.

4. Abnormal stress test.

5. Chronic idiopathic demyelinating polyneuropathy

6. Status post acoustic neuroma resection.



DISCHARGE MEDICATIONS:  See medication reconciliation list.



DISCHARGE INSTRUCTIONS:  The patient discharged home.  Follow up with the

primary care provider to  arrange referral for catheterization at [facility].







  ______________________________________________

  Florencio Norwood M.D.



I have been assigned to dictate discharge summary on this account and I

was not involved in the patient's management.



  ______________________________________________

  Kristi Ramosleann NJumaPJuma





DR:  PERCY

D:  03/05/2018 12:04

T:  03/06/2018 00:17

JOB#:  [id number]

CC:



ALEXIS

## 2018-03-06 NOTE — DIAGNOSTIC IMAGING REPORT
--------------- APPROVED REPORT --------------





CPT Code: 96733



Present Symptoms

Comments: Chest Pain





BILATERAL: Imaging reveals a patent deep venous system bilaterally. There is no evidence 

of thrombus within the femoral, popliteal or tibial segments. The greater saphenous veins 

are also within normal limits. Doppler indicates normal spontaneous flow within these 

segments.

## 2018-03-13 NOTE — DISCHARGE SUMMARY
DATE OF ADMISSION:  02/28/2018



DATE OF DISCHARGE:  03/02/2018



REASON FOR ADMISSION:  The patient is a 52-year-old white male, human

immunodeficiency virus positive, admitted with weight gain and peripheral

edema.



HOSPITAL COURSE:  Following admission, he was seen in Cardiology

consultation by Dr. Haresh Michelle.  Troponin I was slightly elevated on

admission at 0.086 and declined over the several days following admission

to 0.046.  The patient experienced an episode of chest discomfort and was

scheduled for a nuclear stress test, which showed a possible area of

ischemia/infarction.  He remained asymptomatic for the duration of the

hospitalization.  Edema resolved with the discontinuation of Norvasc,

which had been prescribed in December for hypertension.  He was started on

lisinopril and blood pressure was well controlled during hospitalization.

Dr. Michelle concluded that the patient required cardiac catheterization

or a CT angiogram.  The patient preferred to have this done on an

outpatient basis and was discharged in satisfactory condition to be

followed up by Dr. Michelle and a test scheduled.



DISCHARGE DIAGNOSES:

1. Edema secondary to Norvasc, resolved.

2. Hypertension.

3. Human immunodeficiency virus disease, stable.

4. Probable coronary artery disease.



PLAN:  CT angiogram or regular angiogram to be scheduled in the near future

on an outpatient basis.  It was felt that the patient could be discharged

insofar as he was totally asymptomatic and troponins were on the

decline.



DISCHARGE MEDICATIONS:  Medications on discharge included lisinopril,

atorvastatin, aspirin, and his human immunodeficiency virus medications,

Reyataz, Norvir, and Descovy.  The patient will be seen by me in the

private office in one week.









  ______________________________________________

  Florencio Norwood M.D.





DR:  JACINTO

D:  03/12/2018 13:27

T:  03/13/2018 02:40

JOB#:  [id number]

CC:

## 2018-03-14 NOTE — CARDIOLOGY REPORT
--------------- APPROVED REPORT --------------





EKG Measurement

Heart [id number]

OH 176P51

[id number]

[id number]

[id number]





Normal sinus rhythm

Anteroseptal infarct, age undetermined

Abnormal ECG

## 2025-02-24 NOTE — GENERAL PROGRESS NOTE
Progress Note


Progress Note


The patient was seen and examined by me today. He had an episode of chest 

pressure last evening and discharge was cancelled. Today he had nucear stress 

test the results of which were pending at the time I saw the patient. He 

remains asymtomatic and feels very well, in fact better than he did yesterday. 

He has had no further pain or pressure. He attributes pressure of yesterday as 

chest wall discomfort from strenous physical activity of the day before. On exam

: lungs clear, heart S1 S2 normal withut murmur or gallop, no extremity edema.





Assessment:


Norvasc related edema


Elevated troponin and Q V1-2


HIV


R/O coronary artery disease





Plan:


He was seen after my visit by Dr. Michelle and abnormal nuclear stress test 

was discussed. He will need an angiogram or CT angio possibly as an outpatient 

insfar as he is clinically stable.





MIGUEL Monroy Mar 2, 2018 21:58 Diabetes mellitus